# Patient Record
Sex: MALE | Race: WHITE | Employment: FULL TIME | URBAN - METROPOLITAN AREA
[De-identification: names, ages, dates, MRNs, and addresses within clinical notes are randomized per-mention and may not be internally consistent; named-entity substitution may affect disease eponyms.]

---

## 2022-06-13 ENCOUNTER — TELEPHONE (OUTPATIENT)
Dept: GASTROENTEROLOGY | Facility: CLINIC | Age: 32
End: 2022-06-13

## 2022-06-13 ENCOUNTER — OFFICE VISIT (OUTPATIENT)
Dept: GASTROENTEROLOGY | Facility: CLINIC | Age: 32
End: 2022-06-13
Payer: COMMERCIAL

## 2022-06-13 VITALS
WEIGHT: 236 LBS | HEART RATE: 79 BPM | DIASTOLIC BLOOD PRESSURE: 68 MMHG | HEIGHT: 74 IN | BODY MASS INDEX: 30.29 KG/M2 | SYSTOLIC BLOOD PRESSURE: 112 MMHG

## 2022-06-13 DIAGNOSIS — K92.1 BLOOD IN STOOL: Primary | ICD-10-CM

## 2022-06-13 DIAGNOSIS — K92.1 BLOOD IN THE STOOL: ICD-10-CM

## 2022-06-13 DIAGNOSIS — K52.9 COLITIS: ICD-10-CM

## 2022-06-13 DIAGNOSIS — K52.9 COLITIS: Primary | ICD-10-CM

## 2022-06-13 RX ORDER — POLYETHYLENE GLYCOL 3350, SODIUM CHLORIDE, SODIUM BICARBONATE, POTASSIUM CHLORIDE 420; 11.2; 5.72; 1.48 G/4L; G/4L; G/4L; G/4L
POWDER, FOR SOLUTION ORAL
Qty: 1 EACH | Refills: 0 | Status: SHIPPED | OUTPATIENT
Start: 2022-06-13

## 2022-06-13 NOTE — TELEPHONE ENCOUNTER
Scheduled for:  Colonoscopy [address]   Provider Name:  Luzma Aguilar  Date:  7/21/22  Location:   [facility]  Sedation:  Mac  Time:  3:00 Pm (pt is aware to arrive at 2:00 Pm )   Prep: Suprep or Trilyte Prep instructions were given to pt in the office, pt verbalized understanding. Meds/Allergies Reconciled?:  Physician reviewed     Diagnosis with codes:  Colitis K52.9 , Blood in stool K92.1   Was patient informed to call insurance with codes (Y/N):  Yes, I confirmed [employer] [place] insurance with the patient. The patient also verbally understands to call his insurance to check for pre-cert, codes were given on prep instructions. Referral sent?:  Yes  [facility] or [address] notified?:  I sent an electronic request to Endo Scheduling and received a confirmation today. Medication Orders: This patient verbally confirmed that he is not taking:   Iron, blood thinners, BP meds, and is not diabetic   Not latex allergy, Not PCN allergy and does not have a pacemaker Pt is aware to NOT take iron pills, herbal meds and diet supplements for 7 days before exam. Also to NOT take any form of alcohol, recreational drugs and any forms of ED meds 24 hours before exam.    Misc Orders:  Patient was informed that they will need a COVID 19 test prior to their procedure. Patient verbally understood & will await a phone call from [facility] to schedule.       Further instructions given by staff:

## 2022-06-13 NOTE — PATIENT INSTRUCTIONS
1. Colitis  2.  Blood in the stool    Recommend:  Check baseline labs  Get records from [place] [id number]  Colonoscopy with MAC sedation for history of colitis and blood in the stool  Prep: suprep or trilyte or equivalent    ** If MAC @ [facility]:    - HOLD ACE/ARBs the night before and the day of the procedure(s)    - NO alcohol, recreational drugs nor erectile dysfunction mediations 24 hours before procedure(s)   - NO herbal supplements or weight loss medications x 7 days prior to the procedure(s)    ** If MAC @ [facility] or IV twilight - continue all medications as prescribed

## 2022-06-15 ENCOUNTER — TELEPHONE (OUTPATIENT)
Dept: GASTROENTEROLOGY | Facility: CLINIC | Age: 32
End: 2022-06-15

## 2022-06-15 NOTE — TELEPHONE ENCOUNTER
Forms were signed and faxed to [facility] in [place] @[phone] and awaiting for results for Colonoscopy report and Pathology report .

## 2022-06-17 NOTE — TELEPHONE ENCOUNTER
Dr. Hasmukh Sarabia--    Received colonoscopy operative and pathology report performed at [id number] [facility] 03/10/2013. Placed on your desk for further review. Scheduled for colonoscopy 07/21/2022.      Thank you

## 2022-07-07 ENCOUNTER — LAB ENCOUNTER (OUTPATIENT)
Dept: LAB | Age: 32
End: 2022-07-07
Attending: INTERNAL MEDICINE
Payer: COMMERCIAL

## 2022-07-07 DIAGNOSIS — K92.1 BLOOD IN THE STOOL: ICD-10-CM

## 2022-07-07 DIAGNOSIS — K52.9 COLITIS: ICD-10-CM

## 2022-07-07 LAB
ALBUMIN SERPL-MCNC: 4.2 G/DL (ref 3.4–5)
ALBUMIN/GLOB SERPL: 1.4 {RATIO} (ref 1–2)
ALP LIVER SERPL-CCNC: 72 U/L
ALT SERPL-CCNC: 31 U/L
ANION GAP SERPL CALC-SCNC: 8 MMOL/L (ref 0–18)
AST SERPL-CCNC: 22 U/L (ref 15–37)
BILIRUB SERPL-MCNC: 0.6 MG/DL (ref 0.1–2)
BUN BLD-MCNC: 16 MG/DL (ref 7–18)
CALCIUM BLD-MCNC: 9.4 MG/DL (ref 8.5–10.1)
CHLORIDE SERPL-SCNC: 104 MMOL/L (ref 98–112)
CO2 SERPL-SCNC: 27 MMOL/L (ref 21–32)
CREAT BLD-MCNC: 1.14 MG/DL
CRP SERPL-MCNC: 0.57 MG/DL (ref ?–0.3)
ERYTHROCYTE [DISTWIDTH] IN BLOOD BY AUTOMATED COUNT: 12.2 %
ERYTHROCYTE [SEDIMENTATION RATE] IN BLOOD: 7 MM/HR
FASTING STATUS PATIENT QL REPORTED: YES
GLOBULIN PLAS-MCNC: 3.1 G/DL (ref 2.8–4.4)
GLUCOSE BLD-MCNC: 91 MG/DL (ref 70–99)
HCT VFR BLD AUTO: 45.1 %
HGB BLD-MCNC: 15.5 G/DL
MCH RBC QN AUTO: 29.2 PG (ref 26–34)
MCHC RBC AUTO-ENTMCNC: 34.4 G/DL (ref 31–37)
MCV RBC AUTO: 84.9 FL
OSMOLALITY SERPL CALC.SUM OF ELEC: 289 MOSM/KG (ref 275–295)
PLATELET # BLD AUTO: 208 10(3)UL (ref 150–450)
POTASSIUM SERPL-SCNC: 3.9 MMOL/L (ref 3.5–5.1)
PROT SERPL-MCNC: 7.3 G/DL (ref 6.4–8.2)
RBC # BLD AUTO: 5.31 X10(6)UL
SODIUM SERPL-SCNC: 139 MMOL/L (ref 136–145)
WBC # BLD AUTO: 5.2 X10(3) UL (ref 4–11)

## 2022-07-07 PROCEDURE — 36415 COLL VENOUS BLD VENIPUNCTURE: CPT

## 2022-07-07 PROCEDURE — 80053 COMPREHEN METABOLIC PANEL: CPT

## 2022-07-07 PROCEDURE — 85027 COMPLETE CBC AUTOMATED: CPT

## 2022-07-07 PROCEDURE — 85652 RBC SED RATE AUTOMATED: CPT

## 2022-07-07 PROCEDURE — 86140 C-REACTIVE PROTEIN: CPT

## 2022-07-14 ENCOUNTER — OFFICE VISIT (OUTPATIENT)
Dept: AUDIOLOGY | Facility: CLINIC | Age: 32
End: 2022-07-14
Payer: COMMERCIAL

## 2022-07-14 DIAGNOSIS — H90.3 SENSORINEURAL HEARING LOSS, BILATERAL: Primary | ICD-10-CM

## 2022-07-14 PROCEDURE — 92567 TYMPANOMETRY: CPT | Performed by: AUDIOLOGIST

## 2022-07-14 PROCEDURE — 92557 COMPREHENSIVE HEARING TEST: CPT | Performed by: AUDIOLOGIST

## 2022-07-21 ENCOUNTER — HOSPITAL ENCOUNTER (OUTPATIENT)
Age: 32
Setting detail: HOSPITAL OUTPATIENT SURGERY
Discharge: HOME | End: 2022-07-21
Attending: INTERNAL MEDICINE | Admitting: INTERNAL MEDICINE
Payer: COMMERCIAL

## 2022-07-21 ENCOUNTER — ANESTHESIA (OUTPATIENT)
Dept: ENDOSCOPY | Age: 32
End: 2022-07-21
Payer: COMMERCIAL

## 2022-07-21 ENCOUNTER — ANESTHESIA EVENT (OUTPATIENT)
Dept: ENDOSCOPY | Age: 32
End: 2022-07-21
Payer: COMMERCIAL

## 2022-07-21 VITALS
DIASTOLIC BLOOD PRESSURE: 68 MMHG | TEMPERATURE: 98 F | OXYGEN SATURATION: 100 % | SYSTOLIC BLOOD PRESSURE: 126 MMHG | RESPIRATION RATE: 16 BRPM | BODY MASS INDEX: 28.23 KG/M2 | HEART RATE: 80 BPM | WEIGHT: 220 LBS | HEIGHT: 74 IN

## 2022-07-21 DIAGNOSIS — K92.1 BLOOD IN STOOL: ICD-10-CM

## 2022-07-21 DIAGNOSIS — K52.9 COLITIS: ICD-10-CM

## 2022-07-21 PROCEDURE — 45380 COLONOSCOPY AND BIOPSY: CPT | Performed by: INTERNAL MEDICINE

## 2022-07-21 PROCEDURE — 99070 SPECIAL SUPPLIES PHYS/QHP: CPT | Performed by: INTERNAL MEDICINE

## 2022-07-21 RX ORDER — SODIUM CHLORIDE, SODIUM LACTATE, POTASSIUM CHLORIDE, CALCIUM CHLORIDE 600; 310; 30; 20 MG/100ML; MG/100ML; MG/100ML; MG/100ML
INJECTION, SOLUTION INTRAVENOUS CONTINUOUS
Status: DISCONTINUED | OUTPATIENT
Start: 2022-07-21 | End: 2022-07-21

## 2022-07-21 RX ADMIN — SODIUM CHLORIDE, SODIUM LACTATE, POTASSIUM CHLORIDE, CALCIUM CHLORIDE: 600; 310; 30; 20 INJECTION, SOLUTION INTRAVENOUS at 12:35:00

## 2022-07-21 RX ADMIN — SODIUM CHLORIDE, SODIUM LACTATE, POTASSIUM CHLORIDE, CALCIUM CHLORIDE: 600; 310; 30; 20 INJECTION, SOLUTION INTRAVENOUS at 13:15:00

## 2022-08-03 ENCOUNTER — TELEPHONE (OUTPATIENT)
Dept: GASTROENTEROLOGY | Facility: CLINIC | Age: 32
End: 2022-08-03

## 2022-08-03 NOTE — TELEPHONE ENCOUNTER
----- Message from Vikash Gonsales MD sent at 8/3/2022 11:16 AM CDT -----  Follow up with me as needed or 1 year

## 2022-08-03 NOTE — TELEPHONE ENCOUNTER
----- Message from Vikash Gonsales MD sent at 8/3/2022 11:16 AM CDT -----  Recall colonoscopy age 37

## 2022-08-03 NOTE — TELEPHONE ENCOUNTER
Last seen in clinic with Dr. Clarence Galindo 6/13/22. Placed recall to contact patient closer to 1-year f/u time to assist with scheduling annual follow up appointment per patients wishes.      Due for f/u call approximately: 03/13/2023

## 2022-08-03 NOTE — TELEPHONE ENCOUNTER
Entered into Epic. Recall CLN in 10 years per Dr. Cesar Cody. Last CLN done 07/21/2022. Recall entered into Patient Outreach for 07/21/2032. Health Maintenance updated.

## 2023-03-17 ENCOUNTER — TELEPHONE (OUTPATIENT)
Facility: CLINIC | Age: 33
End: 2023-03-17

## 2023-03-24 NOTE — TELEPHONE ENCOUNTER
Called patient, name/ verified. Debora Quintero stated that it's \"the hearing doctor that we are trying to see, my wife must have made a mistake\". Patient denies any symptoms at this time. Instructed pt to call our office if having any GI symptoms. Patient verbalized understanding, no further concerns, issues at this time.

## 2024-12-31 ENCOUNTER — HOSPITAL ENCOUNTER (EMERGENCY)
Facility: HOSPITAL | Age: 34
Discharge: HOME | End: 2024-12-31
Attending: EMERGENCY MEDICINE
Payer: COMMERCIAL

## 2024-12-31 VITALS
TEMPERATURE: 98 F | BODY MASS INDEX: 31.81 KG/M2 | DIASTOLIC BLOOD PRESSURE: 77 MMHG | WEIGHT: 240 LBS | RESPIRATION RATE: 16 BRPM | HEART RATE: 72 BPM | OXYGEN SATURATION: 99 % | SYSTOLIC BLOOD PRESSURE: 118 MMHG | HEIGHT: 73 IN

## 2024-12-31 DIAGNOSIS — T50.905A DRUG-INDUCED NAUSEA AND VOMITING: Primary | ICD-10-CM

## 2024-12-31 DIAGNOSIS — R11.2 DRUG-INDUCED NAUSEA AND VOMITING: Primary | ICD-10-CM

## 2024-12-31 LAB
ALBUMIN SERPL-MCNC: 4.7 G/DL (ref 3.2–4.8)
ALBUMIN/GLOB SERPL: 1.6 {RATIO} (ref 1–2)
ALP LIVER SERPL-CCNC: 90 U/L
ALT SERPL-CCNC: 26 U/L
ANION GAP SERPL CALC-SCNC: 0 MMOL/L (ref 0–18)
AST SERPL-CCNC: 21 U/L (ref ?–34)
BASOPHILS # BLD AUTO: 0.04 X10(3) UL (ref 0–0.2)
BASOPHILS NFR BLD AUTO: 0.3 %
BILIRUB SERPL-MCNC: 0.6 MG/DL (ref 0.3–1.2)
BUN BLD-MCNC: 11 MG/DL (ref 9–23)
CALCIUM BLD-MCNC: 9.5 MG/DL (ref 8.7–10.4)
CHLORIDE SERPL-SCNC: 107 MMOL/L (ref 98–112)
CO2 SERPL-SCNC: 28 MMOL/L (ref 21–32)
CREAT BLD-MCNC: 1.07 MG/DL
EGFRCR SERPLBLD CKD-EPI 2021: 92 ML/MIN/1.73M2 (ref 60–?)
EOSINOPHIL # BLD AUTO: 0 X10(3) UL (ref 0–0.7)
EOSINOPHIL NFR BLD AUTO: 0 %
ERYTHROCYTE [DISTWIDTH] IN BLOOD BY AUTOMATED COUNT: 11.9 %
GLOBULIN PLAS-MCNC: 2.9 G/DL (ref 2–3.5)
GLUCOSE BLD-MCNC: 102 MG/DL (ref 70–99)
HCT VFR BLD AUTO: 41.9 %
HGB BLD-MCNC: 15.2 G/DL
IMM GRANULOCYTES # BLD AUTO: 0.06 X10(3) UL (ref 0–1)
IMM GRANULOCYTES NFR BLD: 0.4 %
LIPASE SERPL-CCNC: 33 U/L (ref 12–53)
LYMPHOCYTES # BLD AUTO: 1.23 X10(3) UL (ref 1–4)
LYMPHOCYTES NFR BLD AUTO: 8.2 %
MCH RBC QN AUTO: 29.2 PG (ref 26–34)
MCHC RBC AUTO-ENTMCNC: 36.3 G/DL (ref 31–37)
MCV RBC AUTO: 80.4 FL
MONOCYTES # BLD AUTO: 0.99 X10(3) UL (ref 0.1–1)
MONOCYTES NFR BLD AUTO: 6.6 %
NEUTROPHILS # BLD AUTO: 12.6 X10 (3) UL (ref 1.5–7.7)
NEUTROPHILS # BLD AUTO: 12.6 X10(3) UL (ref 1.5–7.7)
NEUTROPHILS NFR BLD AUTO: 84.5 %
OSMOLALITY SERPL CALC.SUM OF ELEC: 280 MOSM/KG (ref 275–295)
PLATELET # BLD AUTO: 177 10(3)UL (ref 150–450)
POTASSIUM SERPL-SCNC: 3.9 MMOL/L (ref 3.5–5.1)
PROT SERPL-MCNC: 7.6 G/DL (ref 5.7–8.2)
RBC # BLD AUTO: 5.21 X10(6)UL
SODIUM SERPL-SCNC: 135 MMOL/L (ref 136–145)
WBC # BLD AUTO: 14.9 X10(3) UL (ref 4–11)

## 2024-12-31 PROCEDURE — 99284 EMERGENCY DEPT VISIT MOD MDM: CPT

## 2024-12-31 PROCEDURE — 83690 ASSAY OF LIPASE: CPT | Performed by: EMERGENCY MEDICINE

## 2024-12-31 PROCEDURE — 96374 THER/PROPH/DIAG INJ IV PUSH: CPT

## 2024-12-31 PROCEDURE — 80053 COMPREHEN METABOLIC PANEL: CPT | Performed by: EMERGENCY MEDICINE

## 2024-12-31 PROCEDURE — 96361 HYDRATE IV INFUSION ADD-ON: CPT

## 2024-12-31 PROCEDURE — 85025 COMPLETE CBC W/AUTO DIFF WBC: CPT | Performed by: EMERGENCY MEDICINE

## 2024-12-31 RX ORDER — ONDANSETRON 4 MG/1
4 TABLET, ORALLY DISINTEGRATING ORAL EVERY 4 HOURS PRN
Qty: 10 TABLET | Refills: 0 | Status: SHIPPED | OUTPATIENT
Start: 2024-12-31 | End: 2025-01-07

## 2024-12-31 RX ORDER — ONDANSETRON 2 MG/ML
4 INJECTION INTRAMUSCULAR; INTRAVENOUS ONCE
Status: COMPLETED | OUTPATIENT
Start: 2024-12-31 | End: 2024-12-31

## 2024-12-31 NOTE — ED INITIAL ASSESSMENT (HPI)
Pt arrives to ED for evaluation of vomiting for the past hour, nonstop. Pt denies diarrhea. Pt ate an edible cookie, shortly after the pt started vomiting.     IVF started in triage, pt looks green.

## 2025-01-01 NOTE — ED PROVIDER NOTES
Patient Seen in: [facility] Emergency Department      History     Chief Complaint   Patient presents with    Vomiting     Stated Complaint: vomiting    Subjective:   HPI      34-year-old male presents reporting nausea and vomiting.  He reports he had an edible marijuana cookie and he thinks it must have been a higher dose than they told him it was because within minutes he started to feel very dizzy and confused, and developed forceful vomiting.  He denies any abdominal pain.  He said he pretty much could not stop vomiting for about an hour.  He reports paramedics gave him something for the nausea and he immediately felt better.  He says he feels much better at this time and denies any abdominal pain or nausea.  He is asking for something to drink.  No diarrhea.  No fevers.    Objective:     Past Medical History:    Colitis    Right arm fracture    Age 8- Fractured right forearm/ cast only    Right hand fracture    Fractured 3 fingers/ thumb in right hand    Sleep apnea              Past Surgical History:   Procedure Laterality Date    Colonoscopy      Colonoscopy N/A 7/21/2022    Procedure: COLONOSCOPY;  Surgeon: Sonali Davenport MD;  Location: [facility]    Other surgical history  2021    Right arm ulnar surgery, right hand (finger trigger release)    Skin tissue procedure unlisted                  Social History     Socioeconomic History    Marital status:    Tobacco Use    Smoking status: Never    Smokeless tobacco: Never   Vaping Use    Vaping status: Never Used   Substance and Sexual Activity    Alcohol use: No     Comment: socially    Drug use: Yes     Types: Cannabis     Comment: occasionlly     Social Drivers of Health      Received from [facility]    Social Connections    Received from [facility]    Housing Stability                  Physical Exam     ED Triage Vitals [12/31/24 1749]   /75   Pulse 112   Resp 18   Temp 97.8 °F (36.6 °C)   Temp src  Temporal   SpO2 94 %   O2 Device None (Room air)       Current Vitals:   Vital Signs  BP: 118/88  Pulse: 74  Resp: 18  Temp: 97.8 °F (36.6 °C)  Temp src: Temporal  MAP (mmHg): 97    Oxygen Therapy  SpO2: 96 %  O2 Device: None (Room air)        Physical Exam  General:  Vitals as listed.  No acute distress   HEENT: Sclerae anicteric.  Conjunctivae show no pallor.  Oropharynx clear, mucous membranes moist   Neck: supple, no rigidity   Lungs: good air exchange and clear   Heart: regular rate rhythm and no murmur   Abdomen: Soft and nontender.  No abdominal masses.  No peritoneal signs   Extremities: no edema, normal peripheral pulses   Neuro: Alert oriented and nonfocal   Skin: no rashes or nodules    ED Course     Labs Reviewed   COMP METABOLIC PANEL (14) - Abnormal; Notable for the following components:       Result Value    Glucose 102 (*)     Sodium 135 (*)     All other components within normal limits   CBC WITH DIFFERENTIAL WITH PLATELET - Abnormal; Notable for the following components:    WBC 14.9 (*)     Neutrophil Absolute Prelim 12.60 (*)     Neutrophil Absolute 12.60 (*)     All other components within normal limits   LIPASE - Normal   RAINBOW DRAW GOLD                   [facility]      34-year-old male presents with vomiting.  He ate a edible marijuana cookie and states that within minutes he felt dizzy, altered, developed intractable vomiting that did not improve until he was given antiemetics by paramedics and again here in the ER.  Feeling much better at this time and requesting something to drink    Differential includes but is not limited to dehydration, metabolic disturbance, drug reaction, a life threat.    CBC, CMP, lipase ordered for further evaluation.  1 L NS bolus, Zofran 4 mg IV    No significant laboratory abnormalities.  Tolerating p.o. here.  Will discharge home with Zofran.  Likely reaction to edible marijuana.  Recommend bland diet, good hydration and rest over the next couple of days.  Return  with worsening symptoms or any concerns.            Medical Decision Making      Disposition and Plan     Clinical Impression:  1. Drug-induced nausea and vomiting         Disposition:  Discharge  12/31/2024 11:09 pm    Follow-up:  Ifrah Kaur MD  [address] [id number] [phone]    Follow up  Clinic information for primary care doctor for need someone to follow-up with          Medications Prescribed:  Current Discharge Medication List        START taking these medications    Details   ondansetron 4 MG Oral Tablet Dispersible Take 1 tablet (4 mg total) by mouth every 4 (four) hours as needed for Nausea.  Qty: 10 tablet, Refills: 0                 Supplementary Documentation:

## 2025-01-01 NOTE — ED QUICK NOTES
Patient arrives via EMS with vomiting after eating a THC cookie. States he's not sure how many milligrams of THC the cookie had. Began vomiting nonstop after eating it. Given 1LS NS and Zofran while in waiting room and states he's feeling much better.

## (undated) DEVICE — KIT ENDO ORCAPOD 160/180/190

## (undated) DEVICE — LINE MNTR ADLT SET O2 INTMD

## (undated) DEVICE — 35 ML SYRINGE REGULAR TIP: Brand: MONOJECT

## (undated) DEVICE — FORCEP RADIAL JAW 4

## (undated) DEVICE — STERILE LATEX POWDER-FREE SURGICAL GLOVESWITH NITRILE COATING: Brand: PROTEXIS

## (undated) DEVICE — KIT CLEAN ENDOKIT 1.1OZ GOWNX2

## (undated) DEVICE — MEDI-VAC NON-CONDUCTIVE SUCTION TUBING 6MM X 1.8M (6FT.) L: Brand: CARDINAL HEALTH